# Patient Record
Sex: FEMALE | Race: WHITE
[De-identification: names, ages, dates, MRNs, and addresses within clinical notes are randomized per-mention and may not be internally consistent; named-entity substitution may affect disease eponyms.]

---

## 2018-02-24 ENCOUNTER — TRANSCRIPTION ENCOUNTER (OUTPATIENT)
Age: 48
End: 2018-02-24

## 2018-12-11 PROBLEM — Z00.00 ENCOUNTER FOR PREVENTIVE HEALTH EXAMINATION: Status: ACTIVE | Noted: 2018-12-11

## 2018-12-18 ENCOUNTER — APPOINTMENT (OUTPATIENT)
Dept: PLASTIC SURGERY | Facility: CLINIC | Age: 48
End: 2018-12-18

## 2019-02-05 ENCOUNTER — APPOINTMENT (OUTPATIENT)
Dept: PLASTIC SURGERY | Facility: CLINIC | Age: 49
End: 2019-02-05
Payer: COMMERCIAL

## 2019-02-05 VITALS
BODY MASS INDEX: 28.12 KG/M2 | WEIGHT: 175 LBS | SYSTOLIC BLOOD PRESSURE: 119 MMHG | HEART RATE: 83 BPM | HEIGHT: 66 IN | DIASTOLIC BLOOD PRESSURE: 74 MMHG

## 2019-02-05 DIAGNOSIS — Z85.3 PERSONAL HISTORY OF MALIGNANT NEOPLASM OF BREAST: ICD-10-CM

## 2019-02-05 DIAGNOSIS — Z86.39 PERSONAL HISTORY OF OTHER ENDOCRINE, NUTRITIONAL AND METABOLIC DISEASE: ICD-10-CM

## 2019-02-05 DIAGNOSIS — Z80.41 FAMILY HISTORY OF MALIGNANT NEOPLASM OF OVARY: ICD-10-CM

## 2019-02-05 DIAGNOSIS — N65.1 DISPROPORTION OF RECONSTRUCTED BREAST: ICD-10-CM

## 2019-02-05 DIAGNOSIS — Z78.9 OTHER SPECIFIED HEALTH STATUS: ICD-10-CM

## 2019-02-05 DIAGNOSIS — T85.44XA CAPSULAR CONTRACTURE OF BREAST IMPLANT, INITIAL ENCOUNTER: ICD-10-CM

## 2019-02-05 DIAGNOSIS — N65.0 DEFORMITY OF RECONSTRUCTED BREAST: ICD-10-CM

## 2019-02-05 DIAGNOSIS — Z92.3 PERSONAL HISTORY OF IRRADIATION: ICD-10-CM

## 2019-02-05 PROCEDURE — 99205 OFFICE O/P NEW HI 60 MIN: CPT

## 2019-02-05 RX ORDER — TAMOXIFEN CITRATE 20 MG/1
TABLET, FILM COATED ORAL
Refills: 0 | Status: ACTIVE | COMMUNITY

## 2019-02-05 RX ORDER — LEVOTHYROXINE SODIUM 137 UG/1
TABLET ORAL
Refills: 0 | Status: ACTIVE | COMMUNITY

## 2019-02-05 NOTE — HISTORY OF PRESENT ILLNESS
[FreeTextEntry1] : 48 year old female here for a 2nd opinion (saw Dr. Brooks) regarding her interest in JARAD flap reconstruction. She has a hx of right breast cancer in 2012, s/p b/l non-ns mastectomy, Right ALND (9/25 LN positive) with TE to textured silicone implants with Dr. Vaughn at [facility] [place]. She completed adjuvant chemotherapy after TE placement and radiation therapy, completed in October 2012. She is currently taking Tamoxifen managed by Dr. Victor ([facility] [place]). She is interested in removing silicone implants and desires autologous tissue reconstruction.  \par \par Last month had biopsy of the lef tbreast for a palpable mass --> benign\par \par She has 2 children, 2 pregnancies. LMP age 41\par Family hx of ovarian cancer: mother dx age 65\par Patient underwent genetic testing in 2012 and states results were negative\par \par \par \par \par \par

## 2019-02-05 NOTE — PHYSICAL EXAM
[NI] : Normal [de-identified] : b/l prosthetic reconstruction. Implants in place with NAC tattooing. there is Baker Grade III on the right and Baker Grade II cap con on the left. there is severe asymmetry between the 2 breasts and the footprint of the right breast is malpositioned high on the chest wall with elevation of the IMF. There is no collection or sign of infection.   [de-identified] : Soft, NT, ND, No masses no hernias, well healed  scar. Has adequate skin laxity and adiposity for autologous reconstruction. Does not have any visible stigmata of liposuction.

## 2019-02-05 NOTE — ASSESSMENT
[FreeTextEntry1] : Ms. FOLEY  has severe capsule contracture, muscle animation deformity and pain with asymmetry and suboptimal implant reconstruction and she is interested in delayed autologous reconstruction and removal of the implants. This will correct most of her complaints and allow her to achieve a more natural permanent reconstruction.  I reviewed with ELLY   in detail the risks, benefits, and alternatives of delayed breast reconstruction with autologous tissue, specifically using a free flap from her lower abdomen.  This operation entails transplanting the skin, the fat, and blood vessels from the lower abdomen up to the chest wall where we reattach the artery and vein to blood vessels on the chest wall to re-vascularize the tissue called a ‘flap’ utilizing microsurgical techniques. We attempt to save all of the muscle fibers of the abdominal rectus muscle to minimize the chance of an abdominal wall weakness, bulge or hernia and only transfer the perforating blood vessels.  This is called a JARAD flap.  I explained to her the scar burden associated with this operation including the scars on the breasts and the lower abdomen and around the umbilicus.  I explained to her that there is a risk of free flap loss and vessel thrombosis requiring a return to the operating room for emergent exploration in an attempt to salvage the flap.  If we do lose a flap do to vascular compromise, we would likely place a tissue expander at the time of her returning to the operating room in order to preserve the breast skin envelope and perform a delayed reconstruction.  I reviewed the risks of abdominal wall morbidity including but not limited to abdominal wall weakness, hernia, or bulge. \par The JARAD flap is designed to minimize the risk of abdominal wall morbidity as opposed to a TRAM flap that cuts the abdominal wall muscle, but even a JARAD flap has a small chance of abdominal wall bulge, weakness or hernia.  This operation is approximately 6 hours for one side and 9 hours for a bilateral procedure with a three day hospitalization and a six-week recovery period. \par I also explained that there is a possibility of contour abnormality, asymmetry between the two breasts, and possible need for revision surgery. A surgery on the native contralateral breast to achieve symmetry in the setting of a unilateral mastectomy is usually required and often performed at the time of the implant exchange or nipple reconstruction. The nipple areolar reconstruction is performed at a later date as a separate procedure.

## 2019-04-30 ENCOUNTER — APPOINTMENT (OUTPATIENT)
Dept: PLASTIC SURGERY | Facility: CLINIC | Age: 49
End: 2019-04-30

## 2023-06-05 VITALS
OXYGEN SATURATION: 97 % | HEART RATE: 82 BPM | DIASTOLIC BLOOD PRESSURE: 76 MMHG | WEIGHT: 193 LBS | RESPIRATION RATE: 15 BRPM | SYSTOLIC BLOOD PRESSURE: 128 MMHG | HEIGHT: 65 IN | BODY MASS INDEX: 32.15 KG/M2

## 2023-10-01 PROBLEM — Z92.3 HISTORY OF RADIATION THERAPY: Status: ACTIVE | Noted: 2019-02-05

## 2023-10-05 ENCOUNTER — NON-APPOINTMENT (OUTPATIENT)
Age: 53
End: 2023-10-05

## 2023-10-05 RX ORDER — ESTRADIOL 10 UG/1
10 INSERT VAGINAL
Refills: 0 | Status: ACTIVE | COMMUNITY

## 2023-10-26 ENCOUNTER — APPOINTMENT (OUTPATIENT)
Dept: INTERNAL MEDICINE | Facility: CLINIC | Age: 53
End: 2023-10-26
Payer: COMMERCIAL

## 2023-10-26 VITALS
SYSTOLIC BLOOD PRESSURE: 126 MMHG | DIASTOLIC BLOOD PRESSURE: 80 MMHG | BODY MASS INDEX: 33.15 KG/M2 | HEIGHT: 65 IN | OXYGEN SATURATION: 97 % | HEART RATE: 81 BPM | RESPIRATION RATE: 15 BRPM | WEIGHT: 199 LBS

## 2023-10-26 DIAGNOSIS — E66.9 OBESITY, UNSPECIFIED: ICD-10-CM

## 2023-10-26 DIAGNOSIS — E03.9 HYPOTHYROIDISM, UNSPECIFIED: ICD-10-CM

## 2023-10-26 DIAGNOSIS — E78.2 MIXED HYPERLIPIDEMIA: ICD-10-CM

## 2023-10-26 PROCEDURE — 99213 OFFICE O/P EST LOW 20 MIN: CPT | Mod: 25

## 2023-10-26 PROCEDURE — 36415 COLL VENOUS BLD VENIPUNCTURE: CPT

## 2023-10-26 RX ORDER — SEMAGLUTIDE 1 MG/.5ML
1 INJECTION, SOLUTION SUBCUTANEOUS WEEKLY
Qty: 1 | Refills: 0 | Status: ACTIVE | COMMUNITY
Start: 2023-10-26

## 2023-10-27 LAB
ALBUMIN SERPL ELPH-MCNC: 4.8 G/DL
ALP BLD-CCNC: 104 U/L
ALT SERPL-CCNC: 17 U/L
ANION GAP SERPL CALC-SCNC: 11 MMOL/L
AST SERPL-CCNC: 13 U/L
BILIRUB SERPL-MCNC: 0.2 MG/DL
BUN SERPL-MCNC: 17 MG/DL
CALCIUM SERPL-MCNC: 9.5 MG/DL
CHLORIDE SERPL-SCNC: 102 MMOL/L
CO2 SERPL-SCNC: 27 MMOL/L
CREAT SERPL-MCNC: 0.84 MG/DL
EGFR: 83 ML/MIN/1.73M2
GLUCOSE SERPL-MCNC: 88 MG/DL
POTASSIUM SERPL-SCNC: 4.5 MMOL/L
PROT SERPL-MCNC: 7.3 G/DL
SODIUM SERPL-SCNC: 140 MMOL/L
T3 SERPL-MCNC: 125 NG/DL
T4 FREE SERPL-MCNC: 1.3 NG/DL
TSH SERPL-ACNC: 1.27 UIU/ML

## 2023-10-30 LAB
THYROGLOB AB SERPL-ACNC: 12.2 IU/ML
THYROPEROXIDASE AB SERPL IA-ACNC: 19 IU/ML

## 2023-10-31 ENCOUNTER — TRANSCRIPTION ENCOUNTER (OUTPATIENT)
Age: 53
End: 2023-10-31

## 2023-11-02 LAB — T4 ESOTERIX: 11.1 UG/DL

## 2023-11-05 LAB
THYROGLOB AB SERPL-ACNC: 13 IU/ML
THYROGLOB SERPL-MCNC: <0.1 NG/ML

## 2023-11-06 LAB
CHOLEST SERPL-MCNC: 147 MG/DL
HDLC SERPL-MCNC: 39 MG/DL
LDLC SERPL CALC-MCNC: 79 MG/DL
NONHDLC SERPL-MCNC: 108 MG/DL
TRIGL SERPL-MCNC: 163 MG/DL

## 2024-01-11 ENCOUNTER — APPOINTMENT (OUTPATIENT)
Dept: INTERNAL MEDICINE | Facility: CLINIC | Age: 54
End: 2024-01-11
Payer: COMMERCIAL

## 2024-01-11 VITALS
OXYGEN SATURATION: 96 % | TEMPERATURE: 98.1 F | SYSTOLIC BLOOD PRESSURE: 121 MMHG | HEIGHT: 65 IN | HEART RATE: 83 BPM | BODY MASS INDEX: 33.15 KG/M2 | DIASTOLIC BLOOD PRESSURE: 80 MMHG | RESPIRATION RATE: 14 BRPM | WEIGHT: 199 LBS

## 2024-01-11 DIAGNOSIS — Z02.1 ENCOUNTER FOR PRE-EMPLOYMENT EXAMINATION: ICD-10-CM

## 2024-01-11 PROCEDURE — 99213 OFFICE O/P EST LOW 20 MIN: CPT | Mod: 25

## 2024-01-11 PROCEDURE — 36415 COLL VENOUS BLD VENIPUNCTURE: CPT

## 2024-01-15 LAB
M TB IFN-G BLD-IMP: NEGATIVE
QUANTIFERON TB PLUS MITOGEN MINUS NIL: >10 IU/ML
QUANTIFERON TB PLUS NIL: 0.01 IU/ML
QUANTIFERON TB PLUS TB1 MINUS NIL: 0 IU/ML
QUANTIFERON TB PLUS TB2 MINUS NIL: 0.01 IU/ML

## 2024-01-16 LAB
AMPHET UR-MCNC: NEGATIVE NG/ML
BARBITURATES UR-MCNC: NEGATIVE NG/ML
BENZODIAZ UR-MCNC: NEGATIVE NG/ML
COCAINE METAB.OTHER UR-MCNC: NEGATIVE NG/ML
CREATININE, URINE: 82.6 MG/DL
FENTANYL, URINE: NEGATIVE NG/ML
METHADONE UR-MCNC: NEGATIVE NG/ML
OPIATES UR-MCNC: NEGATIVE NG/ML
OXYCODONE/OXYMORPHONE, URINE: NEGATIVE NG/ML
PCP UR-MCNC: NEGATIVE NG/ML
PH, URINE: 5.4
PLEASE NOTE: DRUGSCRUR: NORMAL
THC UR QL: NEGATIVE NG/ML

## 2024-02-22 ENCOUNTER — RX ONLY (RX ONLY)
Age: 54
End: 2024-02-22

## 2024-02-22 ENCOUNTER — OFFICE (OUTPATIENT)
Dept: URBAN - METROPOLITAN AREA CLINIC 28 | Facility: CLINIC | Age: 54
Setting detail: OPHTHALMOLOGY
End: 2024-02-22
Payer: COMMERCIAL

## 2024-02-22 DIAGNOSIS — H26.493: ICD-10-CM

## 2024-02-22 DIAGNOSIS — H16.223: ICD-10-CM

## 2024-02-22 DIAGNOSIS — H40.013: ICD-10-CM

## 2024-02-22 DIAGNOSIS — E05.00: ICD-10-CM

## 2024-02-22 DIAGNOSIS — Z96.1: ICD-10-CM

## 2024-02-22 PROCEDURE — 92004 COMPRE OPH EXAM NEW PT 1/>: CPT | Performed by: OPHTHALMOLOGY

## 2024-02-22 ASSESSMENT — TEAR BREAK UP TIME (TBUT)
OS_TBUT: 1+ 2+
OD_TBUT: 1+ 2+

## 2024-02-22 ASSESSMENT — REFRACTION_AUTOREFRACTION
OD_AXIS: 160
OS_AXIS: 113
OD_CYLINDER: -1.25
OS_SPHERE: -1.75
OS_CYLINDER: -0.25
OD_SPHERE: -0.50

## 2024-02-22 ASSESSMENT — REFRACTION_CURRENTRX
OD_CYLINDER: -1.00
OS_AXIS: 94
OS_OVR_VA: 20/
OD_SPHERE: +0.25
OS_CYLINDER: -0.50
OS_SPHERE: PLANO
OD_OVR_VA: 20/
OD_AXIS: 151

## 2024-02-22 ASSESSMENT — SPHEQUIV_DERIVED
OS_SPHEQUIV: -1.875
OD_SPHEQUIV: -1.125

## 2024-02-22 ASSESSMENT — CONFRONTATIONAL VISUAL FIELD TEST (CVF)
OD_FINDINGS: FULL
OS_FINDINGS: FULL

## 2024-06-02 ENCOUNTER — RX RENEWAL (OUTPATIENT)
Age: 54
End: 2024-06-02

## 2024-06-02 RX ORDER — ATORVASTATIN CALCIUM 10 MG/1
10 TABLET, FILM COATED ORAL
Qty: 90 | Refills: 0 | Status: ACTIVE | COMMUNITY
Start: 1900-01-01 | End: 1900-01-01

## 2024-06-11 ASSESSMENT — REFRACTION_CURRENTRX
OS_OVR_VA: 20/
OS_AXIS: 94
OS_CYLINDER: -0.50
OD_OVR_VA: 20/
OD_CYLINDER: -1.00
OD_AXIS: 151
OD_SPHERE: +0.25
OS_SPHERE: PLANO

## 2024-06-11 ASSESSMENT — KERATOMETRY
OS_K1POWER_DIOPTERS: 41.75
OD_AXISANGLE_DEGREES: 104
OD_K1POWER_DIOPTERS: 41.50
OD_K2POWER_DIOPTERS: 43.00
OS_AXISANGLE_DEGREES: 084
OS_K2POWER_DIOPTERS: 43.00

## 2024-06-13 ENCOUNTER — RX ONLY (RX ONLY)
Age: 54
End: 2024-06-13

## 2024-06-13 ENCOUNTER — OFFICE (OUTPATIENT)
Dept: URBAN - METROPOLITAN AREA CLINIC 28 | Facility: CLINIC | Age: 54
Setting detail: OPHTHALMOLOGY
End: 2024-06-13
Payer: COMMERCIAL

## 2024-06-13 DIAGNOSIS — H40.013: ICD-10-CM

## 2024-06-13 DIAGNOSIS — E05.00: ICD-10-CM

## 2024-06-13 DIAGNOSIS — H16.223: ICD-10-CM

## 2024-06-13 DIAGNOSIS — H26.493: ICD-10-CM

## 2024-06-13 DIAGNOSIS — Z96.1: ICD-10-CM

## 2024-06-13 PROCEDURE — 92250 FUNDUS PHOTOGRAPHY W/I&R: CPT | Performed by: OPHTHALMOLOGY

## 2024-06-13 PROCEDURE — 76514 ECHO EXAM OF EYE THICKNESS: CPT | Performed by: OPHTHALMOLOGY

## 2024-06-13 PROCEDURE — 92014 COMPRE OPH EXAM EST PT 1/>: CPT | Performed by: OPHTHALMOLOGY

## 2024-06-13 ASSESSMENT — CONFRONTATIONAL VISUAL FIELD TEST (CVF)
OD_FINDINGS: FULL
OS_FINDINGS: FULL

## 2024-08-30 ENCOUNTER — RX RENEWAL (OUTPATIENT)
Age: 54
End: 2024-08-30

## 2024-09-16 ENCOUNTER — APPOINTMENT (OUTPATIENT)
Age: 54
End: 2024-09-16

## 2024-10-29 ENCOUNTER — OFFICE (OUTPATIENT)
Dept: URBAN - METROPOLITAN AREA CLINIC 28 | Facility: CLINIC | Age: 54
Setting detail: OPHTHALMOLOGY
End: 2024-10-29
Payer: COMMERCIAL

## 2024-10-29 DIAGNOSIS — E05.00: ICD-10-CM

## 2024-10-29 DIAGNOSIS — H26.493: ICD-10-CM

## 2024-10-29 DIAGNOSIS — H16.223: ICD-10-CM

## 2024-10-29 DIAGNOSIS — Z96.1: ICD-10-CM

## 2024-10-29 DIAGNOSIS — H40.013: ICD-10-CM

## 2024-10-29 PROCEDURE — 92083 EXTENDED VISUAL FIELD XM: CPT | Performed by: OPHTHALMOLOGY

## 2024-10-29 PROCEDURE — 92133 CPTRZD OPH DX IMG PST SGM ON: CPT | Performed by: OPHTHALMOLOGY

## 2024-10-29 PROCEDURE — 92012 INTRM OPH EXAM EST PATIENT: CPT | Performed by: OPHTHALMOLOGY

## 2024-10-29 ASSESSMENT — TONOMETRY
OD_IOP_MMHG: 10
OS_IOP_MMHG: 11

## 2024-10-29 ASSESSMENT — REFRACTION_AUTOREFRACTION
OD_SPHERE: -0.50
OS_AXIS: 115
OS_SPHERE: -1.25
OD_AXIS: 165
OD_CYLINDER: -1.25
OS_CYLINDER: -0.75

## 2024-10-29 ASSESSMENT — PACHYMETRY
OS_CT_CORRECTION: 0
OS_CT_UM: 545
OD_CT_UM: 550
OD_CT_CORRECTION: -1

## 2024-10-29 ASSESSMENT — TEAR BREAK UP TIME (TBUT)
OD_TBUT: 1+ 2+
OS_TBUT: 1+ 2+

## 2024-10-29 ASSESSMENT — REFRACTION_MANIFEST
OS_SPHERE: PLANO
OD_AXIS: 160
OD_CYLINDER: -1.00
OS_AXIS: 100
OS_CYLINDER: -0.75
OS_ADD: +1.75
OD_ADD: +1.75
OD_SPHERE: PLANO

## 2024-10-29 ASSESSMENT — REFRACTION_CURRENTRX
OD_AXIS: 151
OD_CYLINDER: -1.00
OS_VPRISM_DIRECTION: PROGS
OS_CYLINDER: -0.75
OS_AXIS: 101
OS_OVR_VA: 20/
OD_VPRISM_DIRECTION: PROGS
OS_ADD: +1.75
OD_ADD: +1.75
OD_SPHERE: PLANO
OS_OVR_VA: 20/
OD_OVR_VA: 20/
OD_OVR_VA: 20/
OS_CYLINDER: -0.50
OD_AXIS: 162
OS_SPHERE: PLANO
OD_SPHERE: +0.25
OD_CYLINDER: -1.00
OS_AXIS: 94
OS_SPHERE: PLANO

## 2024-10-29 ASSESSMENT — KERATOMETRY
OD_AXISANGLE_DEGREES: 107
OS_K1POWER_DIOPTERS: 41.75
OD_K1POWER_DIOPTERS: 41.50
OS_K2POWER_DIOPTERS: 42.75
OD_K2POWER_DIOPTERS: 43.25
OS_AXISANGLE_DEGREES: 084

## 2024-10-29 ASSESSMENT — VISUAL ACUITY
OD_BCVA: 20/25-1
OS_BCVA: 20/30+2

## 2024-10-29 ASSESSMENT — CONFRONTATIONAL VISUAL FIELD TEST (CVF)
OD_FINDINGS: FULL
OS_FINDINGS: FULL

## 2024-11-14 ENCOUNTER — LABORATORY RESULT (OUTPATIENT)
Age: 54
End: 2024-11-14

## 2024-11-14 ENCOUNTER — APPOINTMENT (OUTPATIENT)
Age: 54
End: 2024-11-14

## 2024-11-14 VITALS
TEMPERATURE: 98 F | HEART RATE: 83 BPM | OXYGEN SATURATION: 97 % | RESPIRATION RATE: 14 BRPM | HEIGHT: 65 IN | BODY MASS INDEX: 27.16 KG/M2 | SYSTOLIC BLOOD PRESSURE: 123 MMHG | DIASTOLIC BLOOD PRESSURE: 84 MMHG | WEIGHT: 163 LBS

## 2024-11-14 DIAGNOSIS — Z12.11 ENCOUNTER FOR SCREENING FOR MALIGNANT NEOPLASM OF COLON: ICD-10-CM

## 2024-11-14 DIAGNOSIS — Z00.00 ENCOUNTER FOR GENERAL ADULT MEDICAL EXAMINATION W/OUT ABNORMAL FINDINGS: ICD-10-CM

## 2024-11-14 DIAGNOSIS — N63.0 UNSPECIFIED LUMP IN UNSPECIFIED BREAST: ICD-10-CM

## 2024-11-14 DIAGNOSIS — Z23 ENCOUNTER FOR IMMUNIZATION: ICD-10-CM

## 2024-11-14 PROCEDURE — G0444 DEPRESSION SCREEN ANNUAL: CPT | Mod: 59

## 2024-11-14 PROCEDURE — 90656 IIV3 VACC NO PRSV 0.5 ML IM: CPT

## 2024-11-14 PROCEDURE — G0008: CPT

## 2024-11-14 PROCEDURE — 99396 PREV VISIT EST AGE 40-64: CPT | Mod: 25

## 2024-11-14 PROCEDURE — 36415 COLL VENOUS BLD VENIPUNCTURE: CPT

## 2024-11-15 LAB
ALBUMIN SERPL ELPH-MCNC: 4.7 G/DL
ALP BLD-CCNC: 98 U/L
ALT SERPL-CCNC: 16 U/L
ANION GAP SERPL CALC-SCNC: 16 MMOL/L
APPEARANCE: CLEAR
AST SERPL-CCNC: 18 U/L
BASOPHILS # BLD AUTO: 0.09 K/UL
BASOPHILS NFR BLD AUTO: 0.8 %
BILIRUB SERPL-MCNC: 0.2 MG/DL
BILIRUBIN URINE: NEGATIVE
BLOOD URINE: NEGATIVE
BUN SERPL-MCNC: 20 MG/DL
CALCIUM SERPL-MCNC: 9.7 MG/DL
CHLORIDE SERPL-SCNC: 102 MMOL/L
CHOLEST SERPL-MCNC: 146 MG/DL
CO2 SERPL-SCNC: 23 MMOL/L
COLOR: YELLOW
CREAT SERPL-MCNC: 0.83 MG/DL
CREAT SPEC-SCNC: 84 MG/DL
EGFR: 83 ML/MIN/1.73M2
EOSINOPHIL # BLD AUTO: 0.29 K/UL
EOSINOPHIL NFR BLD AUTO: 2.7 %
ESTIMATED AVERAGE GLUCOSE: 103 MG/DL
GLUCOSE QUALITATIVE U: NEGATIVE MG/DL
GLUCOSE SERPL-MCNC: 86 MG/DL
HBA1C MFR BLD HPLC: 5.2 %
HCT VFR BLD CALC: 41.8 %
HDLC SERPL-MCNC: 47 MG/DL
HGB BLD-MCNC: 13.9 G/DL
IMM GRANULOCYTES NFR BLD AUTO: 0.3 %
KETONES URINE: NEGATIVE MG/DL
LDLC SERPL CALC-MCNC: 72 MG/DL
LEUKOCYTE ESTERASE URINE: ABNORMAL
LYMPHOCYTES # BLD AUTO: 3.32 K/UL
LYMPHOCYTES NFR BLD AUTO: 30.5 %
MAN DIFF?: NORMAL
MCHC RBC-ENTMCNC: 30.8 PG
MCHC RBC-ENTMCNC: 33.3 G/DL
MCV RBC AUTO: 92.7 FL
MICROALBUMIN 24H UR DL<=1MG/L-MCNC: <1.2 MG/DL
MICROALBUMIN/CREAT 24H UR-RTO: NORMAL MG/G
MONOCYTES # BLD AUTO: 0.74 K/UL
MONOCYTES NFR BLD AUTO: 6.8 %
NEUTROPHILS # BLD AUTO: 6.43 K/UL
NEUTROPHILS NFR BLD AUTO: 58.9 %
NITRITE URINE: NEGATIVE
NONHDLC SERPL-MCNC: 98 MG/DL
PH URINE: 7
PLATELET # BLD AUTO: 361 K/UL
POTASSIUM SERPL-SCNC: 4.9 MMOL/L
PROT SERPL-MCNC: 7.4 G/DL
PROTEIN URINE: NEGATIVE MG/DL
RBC # BLD: 4.51 M/UL
RBC # FLD: 13.4 %
SODIUM SERPL-SCNC: 141 MMOL/L
SPECIFIC GRAVITY URINE: 1.02
TRIGL SERPL-MCNC: 153 MG/DL
TSH SERPL-ACNC: 1.64 UIU/ML
UROBILINOGEN URINE: 0.2 MG/DL
WBC # FLD AUTO: 10.9 K/UL

## 2024-11-19 LAB — HEMOCCULT STL QL IA: NEGATIVE

## 2025-01-30 ENCOUNTER — OFFICE (OUTPATIENT)
Dept: URBAN - METROPOLITAN AREA CLINIC 28 | Facility: CLINIC | Age: 55
Setting detail: OPHTHALMOLOGY
End: 2025-01-30
Payer: COMMERCIAL

## 2025-01-30 DIAGNOSIS — H40.013: ICD-10-CM

## 2025-01-30 PROCEDURE — 92020 GONIOSCOPY: CPT | Performed by: OPHTHALMOLOGY

## 2025-01-30 PROCEDURE — 92083 EXTENDED VISUAL FIELD XM: CPT | Performed by: OPHTHALMOLOGY

## 2025-01-30 PROCEDURE — 99213 OFFICE O/P EST LOW 20 MIN: CPT | Performed by: OPHTHALMOLOGY

## 2025-01-30 ASSESSMENT — REFRACTION_AUTOREFRACTION
OD_CYLINDER: -1.00
OS_AXIS: 098
OD_AXIS: 152
OD_SPHERE: -1.00
OS_CYLINDER: -0.50
OS_SPHERE: -1.25

## 2025-01-30 ASSESSMENT — KERATOMETRY
OS_K1POWER_DIOPTERS: 41.50
OS_K2POWER_DIOPTERS: 42.25
OD_K2POWER_DIOPTERS: 42.50
OS_AXISANGLE_DEGREES: 088
OD_K1POWER_DIOPTERS: 41.50
OD_AXISANGLE_DEGREES: 104

## 2025-01-30 ASSESSMENT — TONOMETRY
OS_IOP_MMHG: 17
OD_IOP_MMHG: 19

## 2025-01-30 ASSESSMENT — REFRACTION_CURRENTRX
OD_VPRISM_DIRECTION: PROGS
OS_SPHERE: PLANO
OS_ADD: +1.75
OD_CYLINDER: -1.00
OS_AXIS: 101
OD_AXIS: 162
OD_AXIS: 151
OD_SPHERE: PLANO
OS_SPHERE: PLANO
OS_CYLINDER: -0.75
OD_OVR_VA: 20/
OD_SPHERE: +0.25
OS_AXIS: 94
OS_OVR_VA: 20/
OS_OVR_VA: 20/
OD_OVR_VA: 20/
OD_ADD: +1.75
OS_VPRISM_DIRECTION: PROGS
OD_CYLINDER: -1.00
OS_CYLINDER: -0.50

## 2025-01-30 ASSESSMENT — CONFRONTATIONAL VISUAL FIELD TEST (CVF)
OS_FINDINGS: FULL
OD_FINDINGS: FULL

## 2025-01-30 ASSESSMENT — REFRACTION_MANIFEST
OD_ADD: +1.75
OD_CYLINDER: -1.00
OS_SPHERE: PLANO
OD_SPHERE: PLANO
OD_AXIS: 160
OS_CYLINDER: -0.75
OS_AXIS: 100
OS_ADD: +1.75

## 2025-01-30 ASSESSMENT — PACHYMETRY
OS_CT_CORRECTION: 0
OS_CT_UM: 545
OD_CT_CORRECTION: -1
OD_CT_UM: 550

## 2025-01-30 ASSESSMENT — VISUAL ACUITY
OS_BCVA: 20/40
OD_BCVA: 20/25-2

## 2025-01-30 ASSESSMENT — TEAR BREAK UP TIME (TBUT)
OS_TBUT: 1+ 2+
OD_TBUT: 1+ 2+